# Patient Record
Sex: FEMALE | Race: WHITE
[De-identification: names, ages, dates, MRNs, and addresses within clinical notes are randomized per-mention and may not be internally consistent; named-entity substitution may affect disease eponyms.]

---

## 2018-01-22 ENCOUNTER — HOSPITAL ENCOUNTER (EMERGENCY)
Dept: HOSPITAL 11 - JP.ED | Age: 63
Discharge: HOME | End: 2018-01-22
Payer: COMMERCIAL

## 2018-01-22 VITALS — DIASTOLIC BLOOD PRESSURE: 65 MMHG | SYSTOLIC BLOOD PRESSURE: 152 MMHG

## 2018-01-22 DIAGNOSIS — Z91.018: ICD-10-CM

## 2018-01-22 DIAGNOSIS — R00.2: ICD-10-CM

## 2018-01-22 DIAGNOSIS — Z88.1: ICD-10-CM

## 2018-01-22 DIAGNOSIS — Z79.899: ICD-10-CM

## 2018-01-22 DIAGNOSIS — R07.89: Primary | ICD-10-CM

## 2018-01-22 NOTE — EDM.PDOC
ED HPI GENERAL MEDICAL PROBLEM





- General


Chief Complaint: Chest Pain


Stated Complaint: HEART


Time Seen by Provider: 01/22/18 18:18


Source of Information: Reports: Patient, Old Records, RN Notes Reviewed


History Limitations: Reports: No Limitations





- History of Present Illness


INITIAL COMMENTS - FREE TEXT/NARRATIVE: 


here with her 


Chief complaint: Chest ache and palpitations





History of present illness


62-year-old female who has been in good health recently


Was well when she got up this morning but noticed some racing in her heart 

about 9:30 this morning after she was up and around.


She is busy with grandchildren during the day, 1 day per week, so consequently 

she was distracted and didn't pay attention to the palpitations. Over the 

course of the day she was aware of about 4 episodes of palpitations.


No limitation on activities no shortness of breath no sweating no nausea.





Just before dinner at about 4:30 she started feeling some achiness in the right 

shoulder blade area, later it spread to the left shoulder blade area and around 

to the front of the chest. Was most intense between 4:30 PM and 5:30 PM and she 

also felt a little bit going up into the neck. She also had tingling of her 

right upper arm and right forearm without involvement of the fingers at that 

time, again no limitation in activities.


No change of any fever symptoms with movement or position and none of her 

symptoms were severe enough that she took anything for them. However the 

symptoms were unusual for her and so she decided to come to emergency





No history of similar symptoms


Pulmonary valve surgery at age 4


Bilateral mastectomy 8 years ago for breast cancer followed by chemotherapy, no 

radiation.





Never been a smoker


  ** Right Arm


Pain Score (Numeric/FACES): 3





- Related Data


 Allergies











Allergy/AdvReac Type Severity Reaction Status Date / Time


 


cefixime [From Suprax] Allergy  Itching Verified 01/22/18 18:05


 


Citrus And Derivatives Allergy  Cannot Verified 01/22/18 18:05





   Remember  











Home Meds: 


 Home Meds





Brimonidine Tartrate 1 drop EYELF BID 12/06/14 [History]


Dorzolamide HCl [Trusopt] 1 drop EYELF BID 12/06/14 [History]


Multivitamin [Daily Vitamin] 1 tab PO DAILY 12/06/14 [History]


Pilocarpine HCl 1 drop EYELF QID 12/06/14 [History]


prednisoLONE Acetate [Prednisolone Acetate] 1 drop EYELF BID 12/06/14 [History]


Calcium Carb/Vitamin D3/Vit K1 [Viactiv Soft Chew Tablet] 1 each PO DAILY 01/29/ 16 [History]











Past Medical History


HEENT History: Reports: Other (See Below)


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


Cardiovascular History: Reports: Heart Murmur


Other Cardiovascular History: pulmonary stenosis at age 4, surgery


Gastrointestinal History: Reports: None


OB/GYN History: Reports: Pregnancy


Musculoskeletal History: Reports: Fracture


Oncologic (Cancer) History: Reports: Breast





- Infectious Disease History


Infectious Disease History: Reports: Chicken Pox, Measles, Mumps





- Past Surgical History


HEENT Surgical History: Reports: Eye Surgery, Other (See Below)


Other HEENT Surgeries/Procedures: L EYE ICE SYNDROME, L EYE CORNEA TRANSPLANT


Other Cardiovascular Surgeries/Procedures: DEFECTIVE PULMONARY VALVE, ENLARGED 

HEART, surgery age 4 to correct valve


GI Surgical History: Reports: Colonoscopy


Female  Surgical History: Reports: Mastectomy (bilateral and reconstruction)


Other Musculoskeletal Surgeries/Procedures:: L FOOT FX


Oncologic Surgical History: Reports: Biopsy of Breast, Mastectomy


Other Oncologic Surgeries/Procedures: BREAST CA





Social & Family History





- Tobacco Use


Smoking Status *Q: Unknown Ever Smoked


Second Hand Smoke Exposure: No





- Alcohol Use


Days Per Week of Alcohol Use: 1


Number of Drinks Per Day: 1


Total Drinks Per Week: 1





- Recreational Drug Use


Recreational Drug Use: No





ED ROS GENERAL





- Review of Systems


Review Of Systems: See Below


Constitutional: Reports: No Symptoms


HEENT: Reports: No Symptoms


Respiratory: Reports: No Symptoms


Cardiovascular: Reports: Chest Pain (more of an ache and pain), Palpitations.  

Denies: Blood Pressure Problem, Edema, Lightheadedness, Syncope


Endocrine: Reports: No Symptoms


GI/Abdominal: Reports: No Symptoms


: Reports: No Symptoms


Musculoskeletal: Reports: Shoulder Pain, Back Pain.  Denies: Neck Pain, Joint 

Pain, Joint Swelling, Muscle Stiffness


Skin: Reports: No Symptoms


Neurological: Reports: No Symptoms


Psychiatric: Reports: No Symptoms


Hematologic/Lymphatic: Reports: No Symptoms


Immunologic: Reports: No Symptoms





ED EXAM, GENERAL





- Physical Exam


Exam: See Below


Exam Limited By: No Limitations


General Appearance: Alert, No Apparent Distress, Other (appears well, no 

difficulty speaking or breathing, blood pressure is elevated tonight, other 

vital signs are normal)


Eye Exam: Bilateral Eye: Abnormal Pupil (loss of reactivity of the pupils due 

to previous surgery), EOMI


Ears: Normal External Exam, Hearing Grossly Normal


Nose: Normal Inspection, Normal Mucosa


Throat/Mouth: Normal Inspection, Normal Oropharynx, Normal Voice


Head: Atraumatic, Normocephalic


Neck: Normal Inspection, Supple, Non-Tender


Respiratory/Chest: No Respiratory Distress, Lungs Clear, Normal Breath Sounds, 

No Accessory Muscle Use, Chest Non-Tender (anterior and posterior)


Cardiovascular: Normal Peripheral Pulses, Regular Rate, Rhythm, No Edema, No 

Murmur, Other (normal radial pulses)


GI/Abdominal: Non-Tender


Back Exam: Normal Inspection.  No: CVA Tenderness (R), Decreased Range of Motion

, Paraspinal Tenderness, Vertebral Tenderness


Extremities: Normal Inspection, Non-Tender, No Pedal Edema


Neurological: Alert, Oriented, Normal Cognition, No Motor/Sensory Deficits


Psychiatric: Normal Affect, Normal Mood


Skin Exam: Warm, Dry, Intact, Normal Color, No Rash


Lymphatic: No Adenopathy





Course





- Vital Signs


Last Recorded V/S: 


 Last Vital Signs











Temp  36.5 C   01/22/18 17:58


 


Pulse  70   01/22/18 17:58


 


Resp  15   01/22/18 17:58


 


BP  168/101 H  01/22/18 17:58


 


Pulse Ox  96   01/22/18 17:58














- Orders/Labs/Meds


Orders: 


 Active Orders 24 hr











 Category Date Time Status


 


 EKG Documentation Completion [RC] ASDIRECTED Care  01/22/18 18:39 Active


 


 Chest 2V [CR] Stat Exams  01/22/18 18:39 Taken


 


 EKG 12 Lead [EK] Routine Ther  01/22/18 18:39 Ordered











Labs: 


 Laboratory Tests











  01/22/18 01/22/18 01/22/18 Range/Units





  18:39 18:39 18:39 


 


WBC  6.3    (4.5-11.0)  K/uL


 


RBC  4.20    (3.30-5.50)  M/uL


 


Hgb  12.8    (12.0-15.0)  g/dL


 


Hct  38.5    (36.0-48.0)  %


 


MCV  92    (80-98)  fL


 


MCH  31    (27-31)  pg


 


MCHC  33    (32-36)  %


 


Plt Count  235    (150-400)  K/uL


 


D-Dimer, Quantitative   < 100   (0.0-400.0)  ng/mL


 


Sodium    144  (140-148)  mmol/L


 


Potassium    4.2  (3.6-5.2)  mmol/L


 


Chloride    107  (100-108)  mmol/L


 


Carbon Dioxide    28  (21-32)  mmol/L


 


Anion Gap    8.8  (5.0-14.0)  mmol/L


 


BUN    23 H  (7-18)  mg/dL


 


Creatinine    0.9  (0.6-1.0)  mg/dL


 


Est Cr Clr Drug Dosing    55.25  mL/min


 


Estimated GFR (MDRD)    > 60  (>60)  


 


Glucose    135 H  ()  mg/dL


 


Calcium    9.1  (8.5-10.1)  mg/dL


 


Troponin I    < 0.017  (0.000-0.056)  ng/mL














- Re-Assessments/Exams


Free Text/Narrative Re-Assessment/Exam: 





01/22/18 18:55


62-year-old female withno significant cardiac risk factors


4 episodes of palpitations over the course the day and right-sided posterior 

chest ache that became bilateral briefly and some involvement of the right 

anterior chest. No limitations with activities.





Differential diagnosis includes musculoskeletal chest pain, cardiac and 

pulmonary disease considerable less likely. Neuropathic pain is also possibility





Blood pressure elevation tonight





Review of clinic records indicate some pulmonary nodules, she has seen a 

pulmonologist in the follow-up one year from her CT scan in August is 

recommended.


01/22/18 19:32





EKG shows sinus rhythm rate 75, normal by my interpretation


CBC normal


D-dimer normal


01/22/18 20:09


troponin normal, electrolytes normal, glucose 135


Chest x-ray unchanged from previous, does have cardiomegaly from previous 

congenital heart disease





Assessment is noncardiac chest pain and palpitations


Follow-up with primary care recommended if recurring or persisting symptoms, 

return to emergency if worsening symptoms





Departure





- Departure


Time of Disposition: 20:10


Disposition: Admitted As Inpatient 65


Condition: Good


Clinical Impression: 


 Non-cardiac chest pain, Intermittent palpitations








- Discharge Information


Instructions:  Nonspecific Chest Pain, Easy-to-Read, Palpitations


Referrals: 


PCP,None [Primary Care Provider] - 


Forms:  ED Department Discharge


Additional Instructions: 


consider make an appointment with your physician if you continue to have chest 

ache and/or palpitations for more than a few days





Return to emergency if you develop worsening symptoms or persistent palpitations





- My Orders


Last 24 Hours: 


My Active Orders





01/22/18 18:39


EKG Documentation Completion [RC] ASDIRECTED 


Chest 2V [CR] Stat 


EKG 12 Lead [EK] Routine 














- Assessment/Plan


Last 24 Hours: 


My Active Orders





01/22/18 18:39


EKG Documentation Completion [RC] ASDIRECTED 


Chest 2V [CR] Stat 


EKG 12 Lead [EK] Routine

## 2018-01-23 NOTE — CR
Chest 2V

 

INDICATION: chest ache

 

FINDINGS: Comparison 8/29/2017. Bilateral breast implants. Heart size mildly enlarged. Degenerative c
hanges thoracic spine. Chest otherwise negative.